# Patient Record
Sex: FEMALE | Race: WHITE | NOT HISPANIC OR LATINO
[De-identification: names, ages, dates, MRNs, and addresses within clinical notes are randomized per-mention and may not be internally consistent; named-entity substitution may affect disease eponyms.]

---

## 2019-12-26 ENCOUNTER — TRANSCRIPTION ENCOUNTER (OUTPATIENT)
Age: 29
End: 2019-12-26

## 2020-04-30 ENCOUNTER — MESSAGE (OUTPATIENT)
Age: 30
End: 2020-04-30

## 2020-05-02 LAB
SARS-COV-2 IGG SERPL IA-ACNC: <0.1 INDEX
SARS-COV-2 IGG SERPL QL IA: NEGATIVE

## 2020-05-05 ENCOUNTER — APPOINTMENT (OUTPATIENT)
Dept: DISASTER EMERGENCY | Facility: CLINIC | Age: 30
End: 2020-05-05

## 2023-01-09 PROBLEM — Z00.00 ENCOUNTER FOR PREVENTIVE HEALTH EXAMINATION: Status: ACTIVE | Noted: 2023-01-09

## 2023-01-11 ENCOUNTER — TRANSCRIPTION ENCOUNTER (OUTPATIENT)
Age: 33
End: 2023-01-11

## 2023-01-11 ENCOUNTER — APPOINTMENT (OUTPATIENT)
Dept: PSYCHIATRY | Facility: CLINIC | Age: 33
End: 2023-01-11
Payer: COMMERCIAL

## 2023-01-11 PROCEDURE — 99205 OFFICE O/P NEW HI 60 MIN: CPT

## 2023-01-11 RX ORDER — INSULIN LISPRO 100 [IU]/ML
100 INJECTION, SOLUTION INTRAVENOUS; SUBCUTANEOUS
Refills: 0 | Status: ACTIVE | COMMUNITY
Start: 2023-01-11

## 2023-01-11 RX ORDER — INSULIN DEGLUDEC INJECTION 100 U/ML
100 INJECTION, SOLUTION SUBCUTANEOUS
Refills: 0 | Status: ACTIVE | COMMUNITY
Start: 2023-01-11

## 2023-01-20 ENCOUNTER — APPOINTMENT (OUTPATIENT)
Dept: OBGYN | Facility: CLINIC | Age: 33
End: 2023-01-20
Payer: COMMERCIAL

## 2023-01-20 ENCOUNTER — NON-APPOINTMENT (OUTPATIENT)
Age: 33
End: 2023-01-20

## 2023-01-20 VITALS
DIASTOLIC BLOOD PRESSURE: 91 MMHG | HEART RATE: 77 BPM | HEIGHT: 62 IN | SYSTOLIC BLOOD PRESSURE: 159 MMHG | WEIGHT: 150.8 LBS | BODY MASS INDEX: 27.75 KG/M2

## 2023-01-20 DIAGNOSIS — Z01.419 ENCOUNTER FOR GYNECOLOGICAL EXAMINATION (GENERAL) (ROUTINE) W/OUT ABNORMAL FINDINGS: ICD-10-CM

## 2023-01-20 PROCEDURE — 99385 PREV VISIT NEW AGE 18-39: CPT

## 2023-01-20 NOTE — HISTORY OF PRESENT ILLNESS
[FreeTextEntry1] : 31yo  !female LMP   presents for annual GYN visit\par Patient denies any GYN complaints \par Patient enquiries:\par \par Menstrual Cycle:monthly, moderate\par Sexual Activity:yes, one, male \par Contraception: condoms\par Social/Mental Health:\par STD testing:none\par \par \par ROS: Denies fever/chills, HA, Cough/sore throat, CP, SOB, N/V, Diarrhea/Constipation, Pelvic pain, Urinary frequency/ urgency/ Incontinence, irregular vaginal bleeding, discharge or irritation\par \par Medical History\par OBhx: Top \par GYNhx:HPV years with normal paps since, last pap 2 years ago\par PMH:Diabetes\par PSH:none\par Meds:Tricebo, humalog\par ALL:sufa hives\par Social: vape, social etoh\par Famhx:none\par \par Preventative\par PCP:will be starting to see PCP \par Physical Activity:running\par Diet:healthy\par Vitamins D& Ca: none\par \par

## 2023-01-20 NOTE — PLAN
[FreeTextEntry1] : Annual: exam and orders as above, pap \par \par GYN counseling: Patient instructed to call for Unusual Pelvic pain,  UTI symptoms, irregular vaginal bleeding/discharge- Patient verbalized agreement\par \par F/U: patient to follow up in one year for annual GYN exam unless otherwise needed\par \par \par

## 2023-01-23 LAB — HPV HIGH+LOW RISK DNA PNL CVX: NOT DETECTED

## 2023-01-25 LAB — CYTOLOGY CVX/VAG DOC THIN PREP: NORMAL

## 2023-01-29 NOTE — SOCIAL HISTORY
[FreeTextEntry1] : Born and grew up in . Parents were . \par Mother: RN at [employer], has very good relationship. \par Father:  for [employer]. States she felt dad favored sister. 	\par Childhood:affected by her perception of how sister was treated as fav by father. \par Siblings:	one younger sister age 31\par Relationship with siblings:good \par Education: Public school from [employer] to HS. She states her grades were Avg B. She started at community college and failed 1st semester and she was not going to classes because of depression and anxiety about going to classes and finding parking and socializing etc. 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 \par Romantic relationships:relationship for 5 years, BF is \par Trauma:denied \par Legal hx: Patient denies recent or remote hx of legal problems. \par Access to firearms: patient denies. \par \par

## 2023-01-29 NOTE — FAMILY HISTORY
[FreeTextEntry1] : Psychiatric:Dad- not diagnosed, but has anxiety\par paternal aunt- took Xanax prn for anxiety, not sure if took daily meds for anxiety \par \par Substance use: none per patient knowledge\par \par Suicide: none per patient knowledge \par \par Neurological/cardiac/endocrine/cancer/autoimmune/other familial or hereditary disorders: negative per patient knowledge except\par Mom- thyroid problems. \par Dad- Type 2 DM. \par \par

## 2023-01-29 NOTE — PLAN
[FreeTextEntry4] : Discussed with patient her diagnosis, treatment, alternatives to recommended treatment, risk Vs benefits of treatment and no treatment and alternative treatments. \par Medication:  Start bupropion 75 mg/day for a week, then increase bupropion XL to 150 mg/day. Monitor for any worsening of anxiety symptoms \par Educated patient of importance of remaining abstinent from drugs and alcohol. \par Emergency procedures were discussed: pt. educated to call 911 or go to nearest ER for worsening of symptoms/suicidal/homicidal ideation. \par Recommend individual psychotherapy to learn coping skills \par RTC in 4-6 weeks or earlier as needed \par Patient given opportunity to ask questions and their questions were answered and they expressed understanding and agreement with above plan.\par \par I stop checked, no controlled substance Rx in past 12 months: Reference #: [id number]\par \par I spent a total of 60 minutes for today's visit in evaluating and treating the patient as per above, including: preparing for patient's appointment (review of prior documents, [facility] ), performing a medically necessary exam/evaluation, communicating/counseling/educating the patient ordering medications\par \par

## 2023-01-29 NOTE — PHYSICAL EXAM
[None] : none [Average] : average [Cooperative] : cooperative [Clear] : clear [Linear/Goal Directed] : linear/goal directed [None Reported] : none reported [WNL] : within normal limits [Depressed] : depressed [Anxious] : anxious [Constricted] : constricted [de-identified] : No SI/HI

## 2023-01-29 NOTE — PAST MEDICAL HISTORY
[FreeTextEntry1] : Past medical history: \par Type 1 DM- age 14, insulin. \par OTC medications: denies \par TBI: denies\par Seizures: denies \par Migraine HA: yes, diagnosed 2 years ago, was given prn meds, but did not work, frequency once a month, with aura, and pounding headache, \par takes Tylenol and sleep, dark place, subsides in 3-4 hrs. \par \par Developmental History: \par Pre/sergo-ernesto problems: Unremarkable per patient knowledge\par Developmental milestones: unremarkable\par Infections: none per patient knowledge\par Febrile seizures: none per patient knowledge\par \par \par OBGYN hx:\par Menarche= 10. \par Menstrual cycle:regular, LMP=2 weeks.  \par Stopped BC- because it raised BP, off for 7 years. 				\par Pregnancies: 2\par Live birth: 0\par : 2\par

## 2023-01-29 NOTE — DISCUSSION/SUMMARY
[FreeTextEntry1] : The patient is a 31 yo woman with history of symptoms consistent with major depressive disorder, recurrent and generalized anxiety disorder reported this episode of depression and worsening of anxiety symptoms started 6 months ago. Patient concerned about weight gain, and sexual side effects, was on bupropion in the past, states did not remember experiencing worsening of anxiety.

## 2023-01-29 NOTE — HISTORY OF PRESENT ILLNESS
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 \par The patient states she was always anxious and lately she she was having brief periods of depression and was able to oulld self out of these episodes and for past 6 months she has been feeling depressed continuously. She states she was renting out her coop and bills were piling up and work changed and became stressful because the hospital changed to  and systems are changing. She endorsed following symptoms of depression pervasive sad mood, anhedonia, reactive mood, increased appetite, low motivation, sleeping from 9 Pm to 7:30 AM. She denied SIi/HI. She denied feeling hopeless and or helpless. \par She rated depression sx at 6/10, 1o being worse. \par She reported feeling tensed, anxious at her baseline and family tells her she needs to relax, and she states anxiety is 3-4/10, 10 being worse and in past 6 months it increased to 6/10. She states she had panic attacks 2x. \par The patient denies sx suggestive of yazmin, hypomania, psychosis, OCD in the recent or remote past. \par \par  [FreeTextEntry2] : PAST PSYCHIATRIC HISTORY: She started psychiatrist since early 20s. \par Hospitalizations: denied \par Previous suicide attempts:denied \par \par PAST SUBSTANCE ABUSE HISTORY: \par Nicotine: started smoking at 17, quit 25 for 2 years, restarted cigarettes and then switched to nicotine Vape. \par Alcohol:on the weekends, social drink, range 1 glass of wine 6 beers. \par until 23 years, used to a lot more quantity, and still social and 2 nights on the weekends. \par Hx of black \par CAGE questionnaire: Negative\par DUI: denied 	\par \par \par Cannabis: denied \par \par Other Illicit drugs: denied \par  [FreeTextEntry3] : Lexapro in early 20s, Vyvanse in mid 20s for binge eating d/o for 2 months. Wellbutrin for smoking cessation -2 years ago.

## 2023-02-22 ENCOUNTER — APPOINTMENT (OUTPATIENT)
Dept: PSYCHIATRY | Facility: CLINIC | Age: 33
End: 2023-02-22
Payer: COMMERCIAL

## 2023-02-22 PROCEDURE — 99214 OFFICE O/P EST MOD 30 MIN: CPT | Mod: 95

## 2023-02-22 RX ORDER — BUPROPION HYDROCHLORIDE 75 MG/1
75 TABLET, FILM COATED ORAL
Qty: 7 | Refills: 0 | Status: DISCONTINUED | COMMUNITY
Start: 2023-01-11 | End: 2023-02-22

## 2023-02-28 ENCOUNTER — NON-APPOINTMENT (OUTPATIENT)
Age: 33
End: 2023-02-28

## 2023-03-01 ENCOUNTER — APPOINTMENT (OUTPATIENT)
Dept: OPHTHALMOLOGY | Facility: CLINIC | Age: 33
End: 2023-03-01
Payer: COMMERCIAL

## 2023-03-01 ENCOUNTER — NON-APPOINTMENT (OUTPATIENT)
Age: 33
End: 2023-03-01

## 2023-03-01 PROCEDURE — 92004 COMPRE OPH EXAM NEW PT 1/>: CPT

## 2023-03-04 NOTE — REASON FOR VISIT
[Patient preference] : as per patient preference [Telehealth (audio & video) - Individual/Group] : This visit was provided via telehealth using real-time 2-way audio visual technology. [Medical Office: ([facility])___] : The provider was located at the medical office in [unfilled]. [Home] : The patient, [unfilled], was located at home, [unfilled], at the time of the visit.

## 2023-03-04 NOTE — HISTORY OF PRESENT ILLNESS
[FreeTextEntry1] : The patient states is doing good and continues to feel better. She states motivation to do things improved and she is more engaged socially. Sleep- no issues. She is running regularly and losing weight. She rated her depression symptoms at 2-3/10, and anxiety at 3-4/10. No increase in anxiety with bupropion  mg/day. She is on this dose for 4 weeks and she states she states she would like to continue same dose and monitor incremental improvement.

## 2023-03-04 NOTE — PHYSICAL EXAM
[None] : none [Average] : average [Cooperative] : cooperative [Clear] : clear [Linear/Goal Directed] : linear/goal directed [None Reported] : none reported [WNL] : within normal limits [Euthymic] : euthymic [Full] : full [FreeTextEntry7] : No SI/HI

## 2023-03-04 NOTE — PLAN
[FreeTextEntry4] : Discussed with patient her diagnosis, treatment, alternatives to recommended treatment, risk Vs benefits of treatment and no treatment and alternative treatments. \par Medication:  Start bupropion 75 mg/day for a week, then increase bupropion XL to 150 mg/day. Monitor for any worsening of anxiety symptoms \par Educated patient of importance of remaining abstinent from drugs and alcohol. \par Emergency procedures were discussed: pt. educated to call 911 or go to nearest ER for worsening of symptoms/suicidal/homicidal ideation. \par Recommend individual psychotherapy to learn coping skills \par RTC in 4-6 weeks or earlier as needed \par Patient given opportunity to ask questions and their questions were answered and they expressed understanding and agreement with above plan.\par \par I stop checked, no controlled substance Rx in past 12 months: Reference #: [id number]\par \par I spent a total of 60 minutes for today's visit in evaluating and treating the patient as per above, including: preparing for patient's appointment (review of prior documents, [facility] ), performing a medically necessary exam/evaluation, communicating/counseling/educating the patient ordering medications\par \par  [No] : No [Medication education provided] : Medication education provided. [Rationale for medication choices, possible risks/precautions, benefits, alternative treatment choices, and consequences of non-treatment discussed] : Rationale for medication choices, possible risks/precautions, benefits, alternative treatment choices, and consequences of non-treatment discussed with patient/family/caregiver  [FreeTextEntry5] : Psychoeducation and supportive therapy provided,  and discussed rationale for recommended med changes \par Behavior activation\par Sleep hygiene education\par Medication: Continue bupropion  mg/day.\par Educated patient of importance of remaining abstinent from drugs and alcohol. \par Emergency procedures were discussed: pt. educated to call 911 or go to nearest ER for worsening of symptoms/suicidal/homicidal ideation. \par Recommend individual psychotherapy to learn coping skills \par RTC in 6 weeks or earlier as needed \par Patient given opportunity to ask questions and their questions were answered and they expressed understanding and agreement with above plan.\par \par

## 2023-03-04 NOTE — DISCUSSION/SUMMARY
[FreeTextEntry1] : 1/11/2023: The patient is a 31 yo woman with history of symptoms consistent with major depressive disorder, recurrent and generalized anxiety disorder reported this episode of depression and worsening of anxiety symptoms started 6 months ago. Patient concerned about weight gain, and sexual side effects, was on bupropion in the past, states did not remember experiencing worsening of anxiety. \par 2/22/2023: Patient reported improving symptoms of depression and no increase in anxiety symptoms since last visit and she is on bupropion  mg for 4 weeks and she states she states she would like to continue same dose and monitor incremental improvement.

## 2023-03-18 ENCOUNTER — NON-APPOINTMENT (OUTPATIENT)
Age: 33
End: 2023-03-18

## 2023-03-20 ENCOUNTER — APPOINTMENT (OUTPATIENT)
Dept: ORTHOPEDIC SURGERY | Facility: CLINIC | Age: 33
End: 2023-03-20
Payer: COMMERCIAL

## 2023-03-20 VITALS — WEIGHT: 140 LBS | BODY MASS INDEX: 25.76 KG/M2 | HEIGHT: 62 IN

## 2023-03-20 DIAGNOSIS — S80.01XA CONTUSION OF RIGHT KNEE, INITIAL ENCOUNTER: ICD-10-CM

## 2023-03-20 DIAGNOSIS — Z86.39 PERSONAL HISTORY OF OTHER ENDOCRINE, NUTRITIONAL AND METABOLIC DISEASE: ICD-10-CM

## 2023-03-20 DIAGNOSIS — S80.00XA CONTUSION OF UNSPECIFIED KNEE, INITIAL ENCOUNTER: ICD-10-CM

## 2023-03-20 PROCEDURE — 99203 OFFICE O/P NEW LOW 30 MIN: CPT

## 2023-03-20 RX ORDER — NAPROXEN 500 MG/1
500 TABLET ORAL
Qty: 40 | Refills: 0 | Status: ACTIVE | COMMUNITY
Start: 2023-03-20 | End: 1900-01-01

## 2023-03-20 NOTE — HISTORY OF PRESENT ILLNESS
[de-identified] : Patient is here today for the right knee.  Patient was walking a dog who pulled on her and she fell onto the knee in grass.  She notes swelling of the knee and pain anteromedial prox tibia region.  She denies any prior knee issues. she went to  in [place] and had xrays and given Ace wrap.   68 year old female with anxiety. vitals WNl. PE as above.  discussed possible lab work and medication for anxiety but the patient is refusing. the patient states she is resistant to all anxiety medications and doesn't want any. also states she doesn't want any blood work because she had some done not very long ago. denies SI and HI. advised the patient to take her home medications, contact family for support. return precautions given. f/u with PMD.

## 2023-03-20 NOTE — DISCUSSION/SUMMARY
[de-identified] : Options were discussed today including oral anti-inflammatories, Physical Therapy, Steroid Injection, Hyalgan injections or Surgery. The patient had time to ask questions of the different treatment options, including doing nothing and just observing for a finite period of time and re-evaluating in the future.\par Plan is for Naprosyn and PT. F/u in 3 weeks.

## 2023-03-20 NOTE — PHYSICAL EXAM
[5___] : hamstring 5[unfilled]/5 [Right] : right knee [Outside films reviewed] : Outside films reviewed [] : negative Valgus instability [FreeTextEntry9] : ap/lat/sunrise show subtle lucency seen on lateral only anterior tiibal plateau  [TWNoteComboBox7] : flexion 70 degrees [de-identified] : extension 3 degrees

## 2023-03-27 ENCOUNTER — FORM ENCOUNTER (OUTPATIENT)
Age: 33
End: 2023-03-27

## 2023-04-12 ENCOUNTER — APPOINTMENT (OUTPATIENT)
Dept: PSYCHIATRY | Facility: CLINIC | Age: 33
End: 2023-04-12
Payer: COMMERCIAL

## 2023-04-12 DIAGNOSIS — F41.1 GENERALIZED ANXIETY DISORDER: ICD-10-CM

## 2023-04-12 DIAGNOSIS — F33.9 MAJOR DEPRESSIVE DISORDER, RECURRENT, UNSPECIFIED: ICD-10-CM

## 2023-04-12 PROCEDURE — 99214 OFFICE O/P EST MOD 30 MIN: CPT | Mod: 95

## 2023-04-12 RX ORDER — BUPROPION HYDROCHLORIDE 150 MG/1
150 TABLET, EXTENDED RELEASE ORAL
Qty: 30 | Refills: 2 | Status: ACTIVE | COMMUNITY
Start: 2023-01-11 | End: 1900-01-01

## 2023-04-12 NOTE — PHYSICAL EXAM
[None] : none [Average] : average [Cooperative] : cooperative [Euthymic] : euthymic [Full] : full [Clear] : clear [Linear/Goal Directed] : linear/goal directed [None Reported] : none reported [WNL] : within normal limits [FreeTextEntry7] : No SI/HI

## 2023-04-12 NOTE — DISCUSSION/SUMMARY
[FreeTextEntry1] : 1/11/2023: The patient is a 31 yo woman with history of symptoms consistent with major depressive disorder, recurrent and generalized anxiety disorder reported this episode of depression and worsening of anxiety symptoms started 6 months ago. Patient concerned about weight gain, and sexual side effects, was on bupropion in the past, states did not remember experiencing worsening of anxiety. \par 2/22/2023: Patient reported improving symptoms of depression and no increase in anxiety symptoms since last visit and she is on bupropion  mg for 4 weeks and she states she states she would like to continue same dose and monitor incremental improvement. \par 4/12/2023: Patient reports sustained improvement of symptoms of depression and anxiety from last visit, states she would like to continue same dose and monitor for sustained improvement and stability

## 2023-04-12 NOTE — REASON FOR VISIT
[Patient preference] : as per patient preference [Telehealth (audio & video) - Individual/Group] : This visit was provided via telehealth using real-time 2-way audio visual technology. [Medical Office: ([facility])___] : The provider was located at the medical office in [unfilled]. [Other Location: e.g. Home (Enter Location, City,State)___] : The patient, [unfilled], was located at [unfilled] at the time of the visit.

## 2023-04-12 NOTE — HISTORY OF PRESENT ILLNESS
[FreeTextEntry1] : The patient states is continuing to feel good, "same as last time".  Patient reported that she is not feeling depressed and continues to have energy and motivation to do things and she reported no issues with sleep.  Patient states that she is socializing and exercising regularly.  Patient rated her depression at 2-3/10, and anxiety at 3-4/10, 10 being worse.patient states that she is able to manage any life stressors without that affecting her mood and anxiety and states she would like to continue same dose and monitor for sustained improvement and stability \par reported no use in recent or report past change in ETOH from baseline- on weekends, up to 6 beers, no cannabis/illicit drug use. \par recently rxed inhaler for exercise induced asthma.

## 2023-04-12 NOTE — PLAN
[No] : No [Medication education provided] : Medication education provided. [Rationale for medication choices, possible risks/precautions, benefits, alternative treatment choices, and consequences of non-treatment discussed] : Rationale for medication choices, possible risks/precautions, benefits, alternative treatment choices, and consequences of non-treatment discussed with patient/family/caregiver  [FreeTextEntry5] : Psychoeducation and supportive therapy provided,  and discussed rationale for recommended meds and continue to monitor for stability of improvement. \par Behavior activation\par Medication: Continue bupropion  mg/day.\par Educated patient of importance of remaining abstinent from drugs and alcohol. \par Emergency procedures were discussed: pt. educated to call 911 or go to nearest ER for worsening of symptoms/suicidal/homicidal ideation. \par RTC in 3 months or earlier as needed \par Patient given opportunity to ask questions and their questions were answered and they expressed understanding and agreement with above plan.\par \par

## 2023-04-17 ENCOUNTER — APPOINTMENT (OUTPATIENT)
Dept: ORTHOPEDIC SURGERY | Facility: CLINIC | Age: 33
End: 2023-04-17

## 2023-09-03 ENCOUNTER — NON-APPOINTMENT (OUTPATIENT)
Age: 33
End: 2023-09-03

## 2023-09-20 ENCOUNTER — NON-APPOINTMENT (OUTPATIENT)
Age: 33
End: 2023-09-20

## 2023-11-13 ENCOUNTER — NON-APPOINTMENT (OUTPATIENT)
Age: 33
End: 2023-11-13

## 2024-01-23 ENCOUNTER — APPOINTMENT (OUTPATIENT)
Dept: OBGYN | Facility: CLINIC | Age: 34
End: 2024-01-23

## 2024-03-26 ENCOUNTER — APPOINTMENT (OUTPATIENT)
Dept: OBGYN | Facility: CLINIC | Age: 34
End: 2024-03-26

## 2024-05-28 ENCOUNTER — NON-APPOINTMENT (OUTPATIENT)
Age: 34
End: 2024-05-28

## 2024-06-17 ENCOUNTER — APPOINTMENT (OUTPATIENT)
Dept: PLASTIC SURGERY | Facility: CLINIC | Age: 34
End: 2024-06-17

## 2024-10-08 ENCOUNTER — APPOINTMENT (OUTPATIENT)
Dept: OBGYN | Facility: CLINIC | Age: 34
End: 2024-10-08
Payer: COMMERCIAL

## 2024-10-08 VITALS
WEIGHT: 135 LBS | HEIGHT: 62 IN | SYSTOLIC BLOOD PRESSURE: 149 MMHG | BODY MASS INDEX: 24.84 KG/M2 | DIASTOLIC BLOOD PRESSURE: 98 MMHG

## 2024-10-08 DIAGNOSIS — Z01.419 ENCOUNTER FOR GYNECOLOGICAL EXAMINATION (GENERAL) (ROUTINE) W/OUT ABNORMAL FINDINGS: ICD-10-CM

## 2024-10-08 PROCEDURE — 99395 PREV VISIT EST AGE 18-39: CPT

## 2024-10-08 PROCEDURE — 99459 PELVIC EXAMINATION: CPT

## 2024-10-15 LAB
CYTOLOGY CVX/VAG DOC THIN PREP: NORMAL
HPV HIGH+LOW RISK DNA PNL CVX: NOT DETECTED

## 2025-02-11 ENCOUNTER — APPOINTMENT (OUTPATIENT)
Dept: OBGYN | Facility: CLINIC | Age: 35
End: 2025-02-11
Payer: COMMERCIAL

## 2025-02-11 VITALS
DIASTOLIC BLOOD PRESSURE: 78 MMHG | WEIGHT: 149 LBS | BODY MASS INDEX: 28.13 KG/M2 | SYSTOLIC BLOOD PRESSURE: 148 MMHG | HEIGHT: 61 IN

## 2025-02-11 DIAGNOSIS — Z78.9 OTHER SPECIFIED HEALTH STATUS: ICD-10-CM

## 2025-02-11 DIAGNOSIS — N97.9 FEMALE INFERTILITY, UNSPECIFIED: ICD-10-CM

## 2025-02-11 LAB
HCG UR QL: NEGATIVE
QUALITY CONTROL: YES

## 2025-02-11 PROCEDURE — 81025 URINE PREGNANCY TEST: CPT

## 2025-02-11 PROCEDURE — 99212 OFFICE O/P EST SF 10 MIN: CPT

## 2025-02-18 ENCOUNTER — LABORATORY RESULT (OUTPATIENT)
Age: 35
End: 2025-02-18

## 2025-02-21 LAB
ALBUMIN SERPL ELPH-MCNC: 4.3 G/DL
ALP BLD-CCNC: 71 U/L
ALT SERPL-CCNC: 8 U/L
ANION GAP SERPL CALC-SCNC: 13 MMOL/L
ANTI-MUELLERIAN HORMONE: 0.08 NG/ML
AST SERPL-CCNC: 15 U/L
B19V IGG SER QL IA: 3.73 INDEX
B19V IGG+IGM SER-IMP: NORMAL
B19V IGG+IGM SER-IMP: POSITIVE
B19V IGM FLD-ACNC: 0.11 INDEX
B19V IGM SER-ACNC: NEGATIVE
BASOPHILS # BLD AUTO: 0.01 K/UL
BASOPHILS NFR BLD AUTO: 0.2 %
BILIRUB SERPL-MCNC: 0.3 MG/DL
BUN SERPL-MCNC: 9 MG/DL
CALCIUM SERPL-MCNC: 9.3 MG/DL
CHLORIDE SERPL-SCNC: 105 MMOL/L
CMV IGG SERPL QL: <0.2 U/ML
CMV IGG SERPL-IMP: NEGATIVE
CMV IGM SERPL QL: 18.4 AU/ML
CMV IGM SERPL QL: NEGATIVE
CO2 SERPL-SCNC: 25 MMOL/L
CREAT SERPL-MCNC: 0.62 MG/DL
EGFR: 118 ML/MIN/1.73M2
EOSINOPHIL # BLD AUTO: 0.06 K/UL
EOSINOPHIL NFR BLD AUTO: 1.2 %
ESTIMATED AVERAGE GLUCOSE: 177 MG/DL
ESTRADIOL SERPL-MCNC: 31 PG/ML
FSH SERPL-MCNC: 19.1 IU/L
GLUCOSE SERPL-MCNC: 174 MG/DL
HBA1C MFR BLD HPLC: 7.8 %
HBV SURFACE AG SER QL: NONREACTIVE
HCT VFR BLD CALC: 38.3 %
HCV AB SER QL: NONREACTIVE
HCV S/CO RATIO: 0.09 S/CO
HGB A MFR BLD: 55.3 %
HGB A2 MFR BLD: 3.1 %
HGB BLD-MCNC: 12.9 G/DL
HGB FRACT BLD-IMP: NORMAL
HGB S BLD QL SOLY: POSITIVE
HGB S MFR BLD: 41.6 %
HIV1+2 AB SPEC QL IA.RAPID: NONREACTIVE
IMM GRANULOCYTES NFR BLD AUTO: 0.4 %
LEAD BLD-MCNC: <1 UG/DL
LH SERPL-ACNC: 17.5 IU/L
LYMPHOCYTES # BLD AUTO: 1.48 K/UL
LYMPHOCYTES NFR BLD AUTO: 29.5 %
MAN DIFF?: NORMAL
MCHC RBC-ENTMCNC: 30.4 PG
MCHC RBC-ENTMCNC: 33.7 G/DL
MCV RBC AUTO: 90.1 FL
MEV IGG FLD QL IA: 129 AU/ML
MEV IGG+IGM SER-IMP: POSITIVE
MEV IGM SER QL: 0.24 AU
MONOCYTES # BLD AUTO: 0.44 K/UL
MONOCYTES NFR BLD AUTO: 8.8 %
NEUTROPHILS # BLD AUTO: 3 K/UL
NEUTROPHILS NFR BLD AUTO: 59.9 %
PLATELET # BLD AUTO: 192 K/UL
POTASSIUM SERPL-SCNC: 4.3 MMOL/L
PROLACTIN SERPL-MCNC: 4 NG/ML
PROT SERPL-MCNC: 7.2 G/DL
RBC # BLD: 4.25 M/UL
RBC # FLD: 12.7 %
RUBV IGG FLD-ACNC: 2.04 INDEX
RUBV IGG SER-IMP: POSITIVE
RUBV IGM FLD-ACNC: <20 AU/ML
SODIUM SERPL-SCNC: 143 MMOL/L
T GONDII AB SER-IMP: NEGATIVE
T GONDII AB SER-IMP: NEGATIVE
T GONDII IGG SER QL: <3 IU/ML
T GONDII IGM SER QL: <3 AU/ML
T PALLIDUM AB SER QL IA: NEGATIVE
TSH SERPL-ACNC: 0.03 UIU/ML
VZV AB TITR SER: POSITIVE
VZV IGG SER IF-ACNC: 7.17 S/CO
WBC # FLD AUTO: 5.01 K/UL

## 2025-02-22 LAB
AR GENE MUT ANL BLD/T: NORMAL
FMR1 GENE MUT ANL BLD/T: NORMAL
VZV IGM SER IF-ACNC: 1.02 INDEX

## 2025-03-06 ENCOUNTER — APPOINTMENT (OUTPATIENT)
Dept: HUMAN REPRODUCTION | Facility: CLINIC | Age: 35
End: 2025-03-06

## 2025-03-18 ENCOUNTER — APPOINTMENT (OUTPATIENT)
Dept: GASTROENTEROLOGY | Facility: CLINIC | Age: 35
End: 2025-03-18

## 2025-03-20 ENCOUNTER — APPOINTMENT (OUTPATIENT)
Dept: HUMAN REPRODUCTION | Facility: CLINIC | Age: 35
End: 2025-03-20

## 2025-03-21 ENCOUNTER — NON-APPOINTMENT (OUTPATIENT)
Age: 35
End: 2025-03-21

## 2025-06-05 ENCOUNTER — APPOINTMENT (OUTPATIENT)
Dept: OPHTHALMOLOGY | Facility: CLINIC | Age: 35
End: 2025-06-05

## 2025-06-09 ENCOUNTER — APPOINTMENT (OUTPATIENT)
Dept: HUMAN REPRODUCTION | Facility: CLINIC | Age: 35
End: 2025-06-09
Payer: COMMERCIAL

## 2025-06-09 PROCEDURE — 36415 COLL VENOUS BLD VENIPUNCTURE: CPT

## 2025-06-09 PROCEDURE — 76830 TRANSVAGINAL US NON-OB: CPT

## 2025-06-09 PROCEDURE — 99205 OFFICE O/P NEW HI 60 MIN: CPT | Mod: 25

## 2025-06-10 ENCOUNTER — NON-APPOINTMENT (OUTPATIENT)
Age: 35
End: 2025-06-10

## 2025-06-17 ENCOUNTER — APPOINTMENT (OUTPATIENT)
Dept: DERMATOLOGY | Facility: CLINIC | Age: 35
End: 2025-06-17
Payer: COMMERCIAL

## 2025-06-17 PROBLEM — L23.9 ALLERGIC CONTACT DERMATITIS: Status: ACTIVE | Noted: 2025-06-17

## 2025-06-17 PROBLEM — L65.9 HAIR LOSS: Status: ACTIVE | Noted: 2025-06-17

## 2025-06-17 PROCEDURE — 99204 OFFICE O/P NEW MOD 45 MIN: CPT

## 2025-06-17 RX ORDER — HYDROCORTISONE 25 MG/G
2.5 OINTMENT TOPICAL
Qty: 1 | Refills: 1 | Status: ACTIVE | COMMUNITY
Start: 2025-06-17 | End: 1900-01-01

## 2025-06-19 ENCOUNTER — APPOINTMENT (OUTPATIENT)
Dept: HUMAN REPRODUCTION | Facility: CLINIC | Age: 35
End: 2025-06-19
Payer: COMMERCIAL

## 2025-06-19 PROCEDURE — 58999I: CUSTOM

## 2025-06-19 PROCEDURE — 99214 OFFICE O/P EST MOD 30 MIN: CPT | Mod: 25

## 2025-06-19 PROCEDURE — 76831 ECHO EXAM UTERUS: CPT

## 2025-06-19 PROCEDURE — 74740 X-RAY FEMALE GENITAL TRACT: CPT

## 2025-06-19 PROCEDURE — 58340 CATHETER FOR HYSTEROGRAPHY: CPT

## 2025-07-01 ENCOUNTER — APPOINTMENT (OUTPATIENT)
Dept: DERMATOLOGY | Facility: CLINIC | Age: 35
End: 2025-07-01

## 2025-07-07 ENCOUNTER — ASOB RESULT (OUTPATIENT)
Age: 35
End: 2025-07-07

## 2025-07-07 ENCOUNTER — APPOINTMENT (OUTPATIENT)
Dept: MATERNAL FETAL MEDICINE | Facility: CLINIC | Age: 35
End: 2025-07-07
Payer: COMMERCIAL

## 2025-07-07 PROCEDURE — 99205 OFFICE O/P NEW HI 60 MIN: CPT | Mod: 95

## 2025-07-10 ENCOUNTER — RESULT REVIEW (OUTPATIENT)
Age: 35
End: 2025-07-10

## 2025-07-10 ENCOUNTER — APPOINTMENT (OUTPATIENT)
Dept: RADIOLOGY | Facility: HOSPITAL | Age: 35
End: 2025-07-10

## 2025-07-10 ENCOUNTER — OUTPATIENT (OUTPATIENT)
Dept: OUTPATIENT SERVICES | Facility: HOSPITAL | Age: 35
LOS: 1 days | End: 2025-07-10
Payer: COMMERCIAL

## 2025-07-10 DIAGNOSIS — Z31.41 ENCOUNTER FOR FERTILITY TESTING: ICD-10-CM

## 2025-07-10 PROCEDURE — 74740 X-RAY FEMALE GENITAL TRACT: CPT

## 2025-07-10 PROCEDURE — 58340 CATHETER FOR HYSTEROGRAPHY: CPT

## 2025-07-10 PROCEDURE — 74740 X-RAY FEMALE GENITAL TRACT: CPT | Mod: 26

## 2025-07-18 ENCOUNTER — NON-APPOINTMENT (OUTPATIENT)
Age: 35
End: 2025-07-18

## 2025-07-18 ENCOUNTER — APPOINTMENT (OUTPATIENT)
Dept: CARDIOLOGY | Facility: CLINIC | Age: 35
End: 2025-07-18
Payer: COMMERCIAL

## 2025-07-18 VITALS — DIASTOLIC BLOOD PRESSURE: 80 MMHG | SYSTOLIC BLOOD PRESSURE: 138 MMHG

## 2025-07-18 VITALS
WEIGHT: 152 LBS | HEART RATE: 60 BPM | HEIGHT: 61 IN | SYSTOLIC BLOOD PRESSURE: 142 MMHG | OXYGEN SATURATION: 100 % | DIASTOLIC BLOOD PRESSURE: 84 MMHG | BODY MASS INDEX: 28.7 KG/M2

## 2025-07-18 PROBLEM — R03.0 ELEVATED BLOOD PRESSURE READING: Status: ACTIVE | Noted: 2025-07-18

## 2025-07-18 PROBLEM — R94.31 LEFT AXIS DEVIATION: Status: ACTIVE | Noted: 2025-07-18

## 2025-07-18 PROBLEM — F17.290 NICOTINE DEPENDENCE DUE TO VAPING TOBACCO PRODUCT: Status: ACTIVE | Noted: 2025-07-18

## 2025-07-18 PROCEDURE — 93000 ELECTROCARDIOGRAM COMPLETE: CPT

## 2025-07-18 PROCEDURE — 99204 OFFICE O/P NEW MOD 45 MIN: CPT

## 2025-07-18 RX ORDER — LEVOTHYROXINE SODIUM 75 UG/1
75 TABLET ORAL DAILY
Refills: 0 | Status: ACTIVE | COMMUNITY

## 2025-07-31 ENCOUNTER — APPOINTMENT (OUTPATIENT)
Dept: HUMAN REPRODUCTION | Facility: CLINIC | Age: 35
End: 2025-07-31
Payer: COMMERCIAL

## 2025-07-31 PROCEDURE — 99215 OFFICE O/P EST HI 40 MIN: CPT | Mod: 95

## 2025-08-05 ENCOUNTER — NON-APPOINTMENT (OUTPATIENT)
Age: 35
End: 2025-08-05

## 2025-08-05 PROCEDURE — 93784 AMBL BP MNTR W/SOFTWARE: CPT

## 2025-08-12 ENCOUNTER — APPOINTMENT (OUTPATIENT)
Dept: CARDIOLOGY | Facility: CLINIC | Age: 35
End: 2025-08-12

## 2025-08-15 ENCOUNTER — APPOINTMENT (OUTPATIENT)
Dept: CARDIOLOGY | Facility: CLINIC | Age: 35
End: 2025-08-15
Payer: COMMERCIAL

## 2025-08-15 PROCEDURE — 93306 TTE W/DOPPLER COMPLETE: CPT

## 2025-08-18 ENCOUNTER — TRANSCRIPTION ENCOUNTER (OUTPATIENT)
Age: 35
End: 2025-08-18

## 2025-09-03 ENCOUNTER — APPOINTMENT (OUTPATIENT)
Dept: HUMAN REPRODUCTION | Facility: CLINIC | Age: 35
End: 2025-09-03

## 2025-09-15 ENCOUNTER — APPOINTMENT (OUTPATIENT)
Dept: HUMAN REPRODUCTION | Facility: CLINIC | Age: 35
End: 2025-09-15
Payer: COMMERCIAL

## 2025-09-15 PROCEDURE — 99215 OFFICE O/P EST HI 40 MIN: CPT | Mod: 95
